# Patient Record
Sex: FEMALE | Race: OTHER
[De-identification: names, ages, dates, MRNs, and addresses within clinical notes are randomized per-mention and may not be internally consistent; named-entity substitution may affect disease eponyms.]

---

## 2017-07-16 ENCOUNTER — HOSPITAL ENCOUNTER (EMERGENCY)
Dept: HOSPITAL 74 - JERFT | Age: 32
Discharge: HOME | End: 2017-07-16
Payer: COMMERCIAL

## 2017-07-16 VITALS — SYSTOLIC BLOOD PRESSURE: 107 MMHG | HEART RATE: 74 BPM | TEMPERATURE: 98.2 F | DIASTOLIC BLOOD PRESSURE: 45 MMHG

## 2017-07-16 VITALS — BODY MASS INDEX: 35.6 KG/M2
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
## 2017-07-16 DIAGNOSIS — S71.152A: Primary | ICD-10-CM
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
## 2017-07-16 PROCEDURE — 3E0234Z INTRODUCTION OF SERUM, TOXOID AND VACCINE INTO MUSCLE, PERCUTANEOUS APPROACH: ICD-10-PCS
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
## 2019-02-19 ENCOUNTER — APPOINTMENT (OUTPATIENT)
Dept: SURGERY | Facility: CLINIC | Age: 34
End: 2019-02-19

## 2019-02-19 PROBLEM — Z00.00 ENCOUNTER FOR PREVENTIVE HEALTH EXAMINATION: Status: ACTIVE | Noted: 2019-02-19

## 2019-03-05 ENCOUNTER — APPOINTMENT (OUTPATIENT)
Dept: SURGERY | Facility: CLINIC | Age: 34
End: 2019-03-05
Payer: MEDICAID

## 2019-03-05 ENCOUNTER — LABORATORY RESULT (OUTPATIENT)
Age: 34
End: 2019-03-05

## 2019-03-05 VITALS — TEMPERATURE: 98 F | WEIGHT: 180 LBS

## 2019-03-05 VITALS
DIASTOLIC BLOOD PRESSURE: 78 MMHG | HEART RATE: 58 BPM | SYSTOLIC BLOOD PRESSURE: 112 MMHG | HEIGHT: 67 IN | BODY MASS INDEX: 28.19 KG/M2

## 2019-03-05 PROCEDURE — 99215 OFFICE O/P EST HI 40 MIN: CPT

## 2019-03-13 ENCOUNTER — OUTPATIENT (OUTPATIENT)
Dept: OUTPATIENT SERVICES | Facility: HOSPITAL | Age: 34
LOS: 1 days | End: 2019-03-13
Payer: MEDICAID

## 2019-03-13 VITALS
SYSTOLIC BLOOD PRESSURE: 128 MMHG | RESPIRATION RATE: 18 BRPM | HEART RATE: 60 BPM | WEIGHT: 190.04 LBS | HEIGHT: 67 IN | TEMPERATURE: 97 F | OXYGEN SATURATION: 99 % | DIASTOLIC BLOOD PRESSURE: 74 MMHG

## 2019-03-13 DIAGNOSIS — K43.2 INCISIONAL HERNIA WITHOUT OBSTRUCTION OR GANGRENE: ICD-10-CM

## 2019-03-13 DIAGNOSIS — Z90.49 ACQUIRED ABSENCE OF OTHER SPECIFIED PARTS OF DIGESTIVE TRACT: Chronic | ICD-10-CM

## 2019-03-13 DIAGNOSIS — Z01.818 ENCOUNTER FOR OTHER PREPROCEDURAL EXAMINATION: ICD-10-CM

## 2019-03-13 LAB — HCG SERPL-ACNC: <1 MIU/ML — SIGNIFICANT CHANGE UP

## 2019-03-13 PROCEDURE — 84702 CHORIONIC GONADOTROPIN TEST: CPT

## 2019-03-13 PROCEDURE — 36415 COLL VENOUS BLD VENIPUNCTURE: CPT

## 2019-03-13 PROCEDURE — 86850 RBC ANTIBODY SCREEN: CPT

## 2019-03-13 PROCEDURE — 86901 BLOOD TYPING SEROLOGIC RH(D): CPT

## 2019-03-13 PROCEDURE — G0463: CPT

## 2019-03-13 PROCEDURE — 86900 BLOOD TYPING SEROLOGIC ABO: CPT

## 2019-03-13 NOTE — H&P PST ADULT - NEGATIVE CARDIOVASCULAR SYMPTOMS
no chest pain/no palpitations/no paroxysmal nocturnal dyspnea/no peripheral edema/no orthopnea/no dyspnea on exertion

## 2019-03-13 NOTE — H&P PST ADULT - NEGATIVE GASTROINTESTINAL SYMPTOMS
no diarrhea/no flatulence/no vomiting/no constipation/no change in bowel habits/no abdominal pain/no nausea

## 2019-03-13 NOTE — H&P PST ADULT - NSANTHOSAYNRD_GEN_A_CORE
No. WILBER screening performed.  STOP BANG Legend: 0-2 = LOW Risk; 3-4 = INTERMEDIATE Risk; 5-8 = HIGH Risk

## 2019-03-13 NOTE — H&P PST ADULT - REASON FOR ADMISSION
"I have a hernia appeared four years ago that keeps on popping out already after two surgeries" "I have a hernia appeared four years ago that still keeps on popping out already after two surgeries" "I have a hernia which appeared four years ago that still keeps on popping out already after two surgeries"

## 2019-03-13 NOTE — H&P PST ADULT - NSICDXFAMILYHX_GEN_ALL_CORE_FT
FAMILY HISTORY:  Family history of hypertension in mother  Family history of throat cancer, father  FH: lung cancer, father

## 2019-03-13 NOTE — H&P PST ADULT - MUSCULOSKELETAL
detailed exam no joint warmth/normal strength/ROM intact/normal/no joint swelling/no joint erythema/no calf tenderness details…

## 2019-03-13 NOTE — H&P PST ADULT - ASSESSMENT
33 years old female with PMHx of asthma controlled, never been intubated and PSHx incisional hernia X 2, appendectomy, left foot bunion removal, right hand wrist artery repair presented for presurgical evaluation for repair of incisional hernia, removal of excess skin

## 2019-03-13 NOTE — H&P PST ADULT - NSICDXPROBLEM_GEN_ALL_CORE_FT
PROBLEM DIAGNOSES  Problem: Incisional hernia without obstruction or gangrene  Assessment and Plan: -patient is scheduled for repair of incisional hernia/ removal of excess skin on 3/15/2019

## 2019-03-13 NOTE — H&P PST ADULT - NEGATIVE GENERAL GENITOURINARY SYMPTOMS
no dysuria/no hematuria/no flank pain R/no flank pain L/no renal colic/no incontinence/normal urinary frequency/no urinary hesitancy

## 2019-03-13 NOTE — H&P PST ADULT - HISTORY OF PRESENT ILLNESS
33 years old female with PMHx of asthma controlled, never been intubated and PSHx incisional hernia X 2, left foot bunion removal, right hand wrist artery repair presented for presurgical evaluation for repair of incisional hernia, removal of excess skin 33 years old female with PMHx of asthma controlled, never been intubated and PSHx incisional hernia X 2, appendectomy, left foot bunion removal, right hand wrist artery repair presented for presurgical evaluation for repair of incisional hernia, removal of excess skin

## 2019-03-13 NOTE — H&P PST ADULT - NEGATIVE NEUROLOGICAL SYMPTOMS
no transient paralysis/no weakness/no generalized seizures/no paresthesias/no difficulty walking/no focal seizures

## 2019-03-13 NOTE — H&P PST ADULT - NEUROLOGICAL DETAILS
normal strength/responds to pain/alert and oriented x 3/responds to verbal commands/sensation intact/deep reflexes intact/cranial nerves intact

## 2019-03-14 ENCOUNTER — TRANSCRIPTION ENCOUNTER (OUTPATIENT)
Age: 34
End: 2019-03-14

## 2019-03-14 DIAGNOSIS — Z98.890 OTHER SPECIFIED POSTPROCEDURAL STATES: Chronic | ICD-10-CM

## 2019-03-14 RX ORDER — SODIUM CHLORIDE 9 MG/ML
3 INJECTION INTRAMUSCULAR; INTRAVENOUS; SUBCUTANEOUS EVERY 8 HOURS
Qty: 0 | Refills: 0 | Status: DISCONTINUED | OUTPATIENT
Start: 2019-03-15 | End: 2019-03-23

## 2019-03-15 ENCOUNTER — OUTPATIENT (OUTPATIENT)
Dept: OUTPATIENT SERVICES | Facility: HOSPITAL | Age: 34
LOS: 1 days | End: 2019-03-15
Payer: MEDICAID

## 2019-03-15 ENCOUNTER — RESULT REVIEW (OUTPATIENT)
Age: 34
End: 2019-03-15

## 2019-03-15 ENCOUNTER — APPOINTMENT (OUTPATIENT)
Dept: SURGERY | Facility: HOSPITAL | Age: 34
End: 2019-03-15

## 2019-03-15 VITALS
OXYGEN SATURATION: 100 % | DIASTOLIC BLOOD PRESSURE: 64 MMHG | RESPIRATION RATE: 16 BRPM | HEART RATE: 65 BPM | SYSTOLIC BLOOD PRESSURE: 117 MMHG | TEMPERATURE: 98 F

## 2019-03-15 VITALS
DIASTOLIC BLOOD PRESSURE: 64 MMHG | TEMPERATURE: 98 F | SYSTOLIC BLOOD PRESSURE: 130 MMHG | OXYGEN SATURATION: 99 % | WEIGHT: 199.96 LBS | HEIGHT: 67 IN | RESPIRATION RATE: 16 BRPM | HEART RATE: 67 BPM

## 2019-03-15 DIAGNOSIS — Z98.890 OTHER SPECIFIED POSTPROCEDURAL STATES: Chronic | ICD-10-CM

## 2019-03-15 DIAGNOSIS — Z90.49 ACQUIRED ABSENCE OF OTHER SPECIFIED PARTS OF DIGESTIVE TRACT: Chronic | ICD-10-CM

## 2019-03-15 DIAGNOSIS — Z01.818 ENCOUNTER FOR OTHER PREPROCEDURAL EXAMINATION: ICD-10-CM

## 2019-03-15 DIAGNOSIS — K43.2 INCISIONAL HERNIA WITHOUT OBSTRUCTION OR GANGRENE: ICD-10-CM

## 2019-03-15 PROCEDURE — 15847 EXC SKIN ABD ADD-ON: CPT

## 2019-03-15 PROCEDURE — 86850 RBC ANTIBODY SCREEN: CPT

## 2019-03-15 PROCEDURE — 86900 BLOOD TYPING SEROLOGIC ABO: CPT

## 2019-03-15 PROCEDURE — 15830 EXC EXCESSIVE SKIN ABDOMEN: CPT | Mod: XS

## 2019-03-15 PROCEDURE — 49560: CPT

## 2019-03-15 PROCEDURE — 86901 BLOOD TYPING SEROLOGIC RH(D): CPT

## 2019-03-15 PROCEDURE — 15830 EXC EXCESSIVE SKIN ABDOMEN: CPT | Mod: 59

## 2019-03-15 PROCEDURE — 36415 COLL VENOUS BLD VENIPUNCTURE: CPT

## 2019-03-15 RX ORDER — FENTANYL CITRATE 50 UG/ML
25 INJECTION INTRAVENOUS
Qty: 0 | Refills: 0 | Status: DISCONTINUED | OUTPATIENT
Start: 2019-03-15 | End: 2019-03-15

## 2019-03-15 RX ORDER — OXYCODONE AND ACETAMINOPHEN 5; 325 MG/1; MG/1
1 TABLET ORAL EVERY 4 HOURS
Qty: 0 | Refills: 0 | Status: DISCONTINUED | OUTPATIENT
Start: 2019-03-15 | End: 2019-03-15

## 2019-03-15 RX ADMIN — FENTANYL CITRATE 25 MICROGRAM(S): 50 INJECTION INTRAVENOUS at 15:20

## 2019-03-15 RX ADMIN — SODIUM CHLORIDE 3 MILLILITER(S): 9 INJECTION INTRAMUSCULAR; INTRAVENOUS; SUBCUTANEOUS at 09:56

## 2019-03-15 RX ADMIN — FENTANYL CITRATE 25 MICROGRAM(S): 50 INJECTION INTRAVENOUS at 14:38

## 2019-03-15 RX ADMIN — FENTANYL CITRATE 25 MICROGRAM(S): 50 INJECTION INTRAVENOUS at 14:48

## 2019-03-15 RX ADMIN — FENTANYL CITRATE 25 MICROGRAM(S): 50 INJECTION INTRAVENOUS at 14:23

## 2019-03-15 RX ADMIN — FENTANYL CITRATE 25 MICROGRAM(S): 50 INJECTION INTRAVENOUS at 15:10

## 2019-03-15 RX ADMIN — OXYCODONE AND ACETAMINOPHEN 1 TABLET(S): 5; 325 TABLET ORAL at 15:40

## 2019-03-15 NOTE — ASU DISCHARGE PLAN (ADULT/PEDIATRIC) - CARE PROVIDER_API CALL
Sam Rodriguez (MD)  Surgery  [address] [place] [address]  Phone: [phone]  Fax: [phone]  Follow Up Time:

## 2019-03-15 NOTE — ASU DISCHARGE PLAN (ADULT/PEDIATRIC) - CALL YOUR DOCTOR IF YOU HAVE ANY OF THE FOLLOWING:
Bleeding that does not stop/Swelling that gets worse/Fever greater than (need to indicate Fahrenheit or Celsius)/Wound/Surgical Site with redness, or foul smelling discharge or pus Swelling that gets worse/Pain not relieved by Medications/Unable to urinate/Nausea and vomiting that does not stop/Inability to tolerate liquids or foods/Bleeding that does not stop/Fever greater than (need to indicate Fahrenheit or Celsius)/Wound/Surgical Site with redness, or foul smelling discharge or pus

## 2019-03-15 NOTE — ASU DISCHARGE PLAN (ADULT/PEDIATRIC) - NURSING INSTRUCTIONS
keep dressing dry, clean, intact, for 2 days. After 2 days, remove dressing and leave steri strips on. You can shower with steri strips on. If it fall off after shower is OK, if not you leave it there, it will fall off by itself in 2-3 days. No heavy lifting or strenuous activity. Nothing heavier than 10- 15 lbs for 6 -8 weeks No heavy lifting or strenuous activity. Nothing heavier than 10- 15 lbs fo two weeks

## 2019-03-17 ENCOUNTER — EMERGENCY (EMERGENCY)
Facility: HOSPITAL | Age: 34
LOS: 1 days | Discharge: HOME | End: 2019-03-17
Attending: EMERGENCY MEDICINE
Payer: MEDICAID

## 2019-03-17 VITALS
HEART RATE: 64 BPM | TEMPERATURE: 98 F | RESPIRATION RATE: 17 BRPM | DIASTOLIC BLOOD PRESSURE: 55 MMHG | OXYGEN SATURATION: 99 % | SYSTOLIC BLOOD PRESSURE: 115 MMHG

## 2019-03-17 VITALS — WEIGHT: 220.02 LBS | HEIGHT: 67 IN

## 2019-03-17 DIAGNOSIS — Z98.890 OTHER SPECIFIED POSTPROCEDURAL STATES: Chronic | ICD-10-CM

## 2019-03-17 DIAGNOSIS — Z90.49 ACQUIRED ABSENCE OF OTHER SPECIFIED PARTS OF DIGESTIVE TRACT: Chronic | ICD-10-CM

## 2019-03-17 PROBLEM — J45.909 UNSPECIFIED ASTHMA, UNCOMPLICATED: Chronic | Status: ACTIVE | Noted: 2019-03-14

## 2019-03-17 LAB
ALBUMIN SERPL ELPH-MCNC: 3.6 G/DL — SIGNIFICANT CHANGE UP (ref 3.5–5)
ALP SERPL-CCNC: 39 U/L — LOW (ref 40–120)
ALT FLD-CCNC: 13 U/L DA — SIGNIFICANT CHANGE UP (ref 10–60)
ANION GAP SERPL CALC-SCNC: 4 MMOL/L — LOW (ref 5–17)
APPEARANCE UR: CLEAR — SIGNIFICANT CHANGE UP
AST SERPL-CCNC: 10 U/L — SIGNIFICANT CHANGE UP (ref 10–40)
BASOPHILS # BLD AUTO: 0.05 K/UL — SIGNIFICANT CHANGE UP (ref 0–0.2)
BASOPHILS NFR BLD AUTO: 0.6 % — SIGNIFICANT CHANGE UP (ref 0–2)
BILIRUB SERPL-MCNC: 0.4 MG/DL — SIGNIFICANT CHANGE UP (ref 0.2–1.2)
BILIRUB UR-MCNC: NEGATIVE — SIGNIFICANT CHANGE UP
BUN SERPL-MCNC: 8 MG/DL — SIGNIFICANT CHANGE UP (ref 7–18)
CALCIUM SERPL-MCNC: 8.5 MG/DL — SIGNIFICANT CHANGE UP (ref 8.4–10.5)
CHLORIDE SERPL-SCNC: 108 MMOL/L — SIGNIFICANT CHANGE UP (ref 96–108)
CO2 SERPL-SCNC: 28 MMOL/L — SIGNIFICANT CHANGE UP (ref 22–31)
COLOR SPEC: YELLOW — SIGNIFICANT CHANGE UP
CREAT SERPL-MCNC: 0.76 MG/DL — SIGNIFICANT CHANGE UP (ref 0.5–1.3)
DIFF PNL FLD: ABNORMAL
EOSINOPHIL # BLD AUTO: 0.04 K/UL — SIGNIFICANT CHANGE UP (ref 0–0.5)
EOSINOPHIL NFR BLD AUTO: 0.5 % — SIGNIFICANT CHANGE UP (ref 0–6)
GLUCOSE SERPL-MCNC: 89 MG/DL — SIGNIFICANT CHANGE UP (ref 70–99)
GLUCOSE UR QL: NEGATIVE — SIGNIFICANT CHANGE UP
HCG SERPL-ACNC: <1 MIU/ML — SIGNIFICANT CHANGE UP
HCT VFR BLD CALC: 35.9 % — SIGNIFICANT CHANGE UP (ref 34.5–45)
HGB BLD-MCNC: 11.5 G/DL — SIGNIFICANT CHANGE UP (ref 11.5–15.5)
IMM GRANULOCYTES NFR BLD AUTO: 0.4 % — SIGNIFICANT CHANGE UP (ref 0–1.5)
KETONES UR-MCNC: NEGATIVE — SIGNIFICANT CHANGE UP
LEUKOCYTE ESTERASE UR-ACNC: ABNORMAL
LIDOCAIN IGE QN: 62 U/L — LOW (ref 73–393)
LYMPHOCYTES # BLD AUTO: 1.94 K/UL — SIGNIFICANT CHANGE UP (ref 1–3.3)
LYMPHOCYTES # BLD AUTO: 23 % — SIGNIFICANT CHANGE UP (ref 13–44)
MCHC RBC-ENTMCNC: 30.7 PG — SIGNIFICANT CHANGE UP (ref 27–34)
MCHC RBC-ENTMCNC: 32 GM/DL — SIGNIFICANT CHANGE UP (ref 32–36)
MCV RBC AUTO: 96 FL — SIGNIFICANT CHANGE UP (ref 80–100)
MONOCYTES # BLD AUTO: 0.57 K/UL — SIGNIFICANT CHANGE UP (ref 0–0.9)
MONOCYTES NFR BLD AUTO: 6.8 % — SIGNIFICANT CHANGE UP (ref 2–14)
NEUTROPHILS # BLD AUTO: 5.79 K/UL — SIGNIFICANT CHANGE UP (ref 1.8–7.4)
NEUTROPHILS NFR BLD AUTO: 68.7 % — SIGNIFICANT CHANGE UP (ref 43–77)
NITRITE UR-MCNC: NEGATIVE — SIGNIFICANT CHANGE UP
NRBC # BLD: 0 /100 WBCS — SIGNIFICANT CHANGE UP (ref 0–0)
PH UR: 8 — SIGNIFICANT CHANGE UP (ref 5–8)
PLATELET # BLD AUTO: 175 K/UL — SIGNIFICANT CHANGE UP (ref 150–400)
POTASSIUM SERPL-MCNC: 3.6 MMOL/L — SIGNIFICANT CHANGE UP (ref 3.5–5.3)
POTASSIUM SERPL-SCNC: 3.6 MMOL/L — SIGNIFICANT CHANGE UP (ref 3.5–5.3)
PROT SERPL-MCNC: 6.6 G/DL — SIGNIFICANT CHANGE UP (ref 6–8.3)
PROT UR-MCNC: 15
RBC # BLD: 3.74 M/UL — LOW (ref 3.8–5.2)
RBC # FLD: 12.9 % — SIGNIFICANT CHANGE UP (ref 10.3–14.5)
SODIUM SERPL-SCNC: 140 MMOL/L — SIGNIFICANT CHANGE UP (ref 135–145)
SP GR SPEC: 1.01 — SIGNIFICANT CHANGE UP (ref 1.01–1.02)
UROBILINOGEN FLD QL: NEGATIVE — SIGNIFICANT CHANGE UP
WBC # BLD: 8.42 K/UL — SIGNIFICANT CHANGE UP (ref 3.8–10.5)
WBC # FLD AUTO: 8.42 K/UL — SIGNIFICANT CHANGE UP (ref 3.8–10.5)

## 2019-03-17 PROCEDURE — 74177 CT ABD & PELVIS W/CONTRAST: CPT

## 2019-03-17 PROCEDURE — 71275 CT ANGIOGRAPHY CHEST: CPT

## 2019-03-17 PROCEDURE — 96375 TX/PRO/DX INJ NEW DRUG ADDON: CPT

## 2019-03-17 PROCEDURE — 85027 COMPLETE CBC AUTOMATED: CPT

## 2019-03-17 PROCEDURE — 74177 CT ABD & PELVIS W/CONTRAST: CPT | Mod: 26

## 2019-03-17 PROCEDURE — 83690 ASSAY OF LIPASE: CPT

## 2019-03-17 PROCEDURE — 81001 URINALYSIS AUTO W/SCOPE: CPT

## 2019-03-17 PROCEDURE — 71275 CT ANGIOGRAPHY CHEST: CPT | Mod: 26

## 2019-03-17 PROCEDURE — 99284 EMERGENCY DEPT VISIT MOD MDM: CPT

## 2019-03-17 PROCEDURE — 36415 COLL VENOUS BLD VENIPUNCTURE: CPT

## 2019-03-17 PROCEDURE — 96374 THER/PROPH/DIAG INJ IV PUSH: CPT | Mod: XU

## 2019-03-17 PROCEDURE — 84702 CHORIONIC GONADOTROPIN TEST: CPT

## 2019-03-17 PROCEDURE — 99284 EMERGENCY DEPT VISIT MOD MDM: CPT | Mod: 25

## 2019-03-17 PROCEDURE — 80053 COMPREHEN METABOLIC PANEL: CPT

## 2019-03-17 RX ORDER — SODIUM CHLORIDE 9 MG/ML
1000 INJECTION INTRAMUSCULAR; INTRAVENOUS; SUBCUTANEOUS ONCE
Qty: 0 | Refills: 0 | Status: COMPLETED | OUTPATIENT
Start: 2019-03-17 | End: 2019-03-17

## 2019-03-17 RX ORDER — MORPHINE SULFATE 50 MG/1
4 CAPSULE, EXTENDED RELEASE ORAL ONCE
Qty: 0 | Refills: 0 | Status: DISCONTINUED | OUTPATIENT
Start: 2019-03-17 | End: 2019-03-17

## 2019-03-17 RX ORDER — ACETAMINOPHEN 500 MG
1000 TABLET ORAL ONCE
Qty: 0 | Refills: 0 | Status: COMPLETED | OUTPATIENT
Start: 2019-03-17 | End: 2019-03-17

## 2019-03-17 RX ADMIN — SODIUM CHLORIDE 1000 MILLILITER(S): 9 INJECTION INTRAMUSCULAR; INTRAVENOUS; SUBCUTANEOUS at 13:01

## 2019-03-17 RX ADMIN — Medication 400 MILLIGRAM(S): at 13:01

## 2019-03-17 RX ADMIN — Medication 1000 MILLIGRAM(S): at 13:56

## 2019-03-17 RX ADMIN — MORPHINE SULFATE 4 MILLIGRAM(S): 50 CAPSULE, EXTENDED RELEASE ORAL at 13:56

## 2019-03-17 NOTE — ED PROVIDER NOTE - PROGRESS NOTE DETAILS
Discussed care with surgical house officer who stated pt is okay to discharge if work up is negative. Pain most likely post operative pain. Reassessed pt following administration of analgesics. Pt's pain improved significantly. Pt is requesting food. Awaiting CT results. Imaging shows no acute process. Patient's pain improved. Will DC home.

## 2019-03-17 NOTE — ED PROVIDER NOTE - HISTORY ATTESTATION, MLM
Patient Instructions by LESLY Jacobson at 01/11/17 10:04 AM     Author:  LESLY Jacobson Service:  (none) Author Type:  Psychologist     Filed:  01/11/17 10:04 AM Encounter Date:  1/11/2017 Status:  Signed     :  LESLY Jacobson (Psychologist)            PATIENT INFORMATION     Follow-Up  -- Make an appointment with LESLY Naranjo in 2 weeks    Additional Educational Resources: For additional resources regarding your symptoms, diagnosis, or further health information, please visit the Health Resources section on [email or web address] or the Online Health Resources section in [employer]. Revision History        User Key Date/Time User Provider Type Action    > [N/A] 01/11/17 10:04 AM Nicolas Shea PSY. D Psychologist Sign I have reviewed and confirmed nurses' notes...

## 2019-03-17 NOTE — ED ADULT NURSE NOTE - NSIMPLEMENTINTERV_GEN_ALL_ED
Implemented All Universal Safety Interventions:  [place] to call system. Call bell, personal items and telephone within reach. Instruct patient to call for assistance. Room bathroom lighting operational. Non-slip footwear when patient is off stretcher. Physically safe environment: no spills, clutter or unnecessary equipment. Stretcher in lowest position, wheels locked, appropriate side rails in place.

## 2019-03-17 NOTE — ED ADULT NURSE NOTE - OBJECTIVE STATEMENT
pt is a 32 y/o female with c/o abdominal pain  s/p hernia surgery 2 days ago  .  pt still with  SUNITA  on her abdomen, no active bleeding  noted , pt wound dressing is intact. abdomen soft non distended + BS noted.

## 2019-03-17 NOTE — ED PROVIDER NOTE - ABDOMINAL EXAM
tender.../3 laparoscopic incisions C/D/I along the inferior abd, 2 incisions with SUNITA drain suctioning small amount of serosanguinous/soft

## 2019-03-17 NOTE — ED PROVIDER NOTE - OBJECTIVE STATEMENT
34 y/o F pt with PMHx of asthma and s/p incisional hernia repair done 3 days ago by Dr. Rodriguez presents to ED c/o generalized abd pain x3 days. Pt reports that pain has been present since the procedure, and relays having an episode of shortness of breath and generalized weakness yesterday and today. Pain is localized throughout abd and R lower rib. Pt was previously evaluated at [facility] and was discharge to home after negative work up. Patient denies fever, chills, syncope, nausea, vomiting, BM issues, urinary symptoms, or any other complaints.

## 2019-03-17 NOTE — ED PROVIDER NOTE - CLINICAL SUMMARY MEDICAL DECISION MAKING FREE TEXT BOX
32 y/o F pt s/p incisional hernia repair, post op 3 days, presents with abd pain for 3 days. Will evaluate for intraabdominal complications versus PE. Will provide analgesics and reassess.

## 2019-03-18 NOTE — BRIEF OPERATIVE NOTE - NSICDXBRIEFPROCEDURE_GEN_ALL_CORE_FT
PROCEDURES:  Surgical removal of excess skin of abdomen 18-Mar-2019 09:22:00  Cayetano Reyes  Open repair of incisional hernia in adult 18-Mar-2019 09:21:50  Cayetano Reyes

## 2019-03-18 NOTE — CHART NOTE - NSCHARTNOTEFT_GEN_A_CORE
Pt returned to ED c/o post-op pain day 2  ED was to do CT r/o PE as pt was having some pleuritic/rib pain  had neg w/u a day earlier at [facility]    Vital Signs Last 24 Hrs  T(C): 36.6 (17 Mar 2019 15:56), Max: 37.2 (17 Mar 2019 12:05)  T(F): 97.9 (17 Mar 2019 15:56), Max: 99 (17 Mar 2019 12:05)  HR: 64 (17 Mar 2019 15:56) (64 - 77)  BP: 115/55 (17 Mar 2019 15:56) (115/55 - 122/79)  BP(mean): --  RR: 17 (17 Mar 2019 15:56) (17 - 18)  SpO2: 99% (17 Mar 2019 15:56) (99% - 100%)                          11.5   8.42  )-----------( 175      ( 17 Mar 2019 13:03 )             35.9  CT results with no acute surgical path  Impression    Normal pulmonary vasculature with no evidence of PE.    Moderate bibasilar platelike atelectasis/possible infiltrate.    No acute intra-abdominal or intrapelvic findings.    Extensive soft tissue emphysema/infiltration in the lower abdominal and   pelvic wall anteriorly with two subcutaneous drains in place, no discrete   collection. Findings possibly postprocedural.      ED had discharged pt home after CT findings as pt's pain improved and was requesting food  d/w Dr Rey

## 2019-03-19 ENCOUNTER — APPOINTMENT (OUTPATIENT)
Dept: SURGERY | Facility: CLINIC | Age: 34
End: 2019-03-19
Payer: MEDICAID

## 2019-03-19 DIAGNOSIS — Z56.0 UNEMPLOYMENT, UNSPECIFIED: ICD-10-CM

## 2019-03-19 DIAGNOSIS — Z87.09 PERSONAL HISTORY OF OTHER DISEASES OF THE RESPIRATORY SYSTEM: ICD-10-CM

## 2019-03-19 DIAGNOSIS — Z78.9 OTHER SPECIFIED HEALTH STATUS: ICD-10-CM

## 2019-03-19 DIAGNOSIS — Z80.1 FAMILY HISTORY OF MALIGNANT NEOPLASM OF TRACHEA, BRONCHUS AND LUNG: ICD-10-CM

## 2019-03-19 DIAGNOSIS — K43.2 INCISIONAL HERNIA W/OUT OBSTRUCTION OR GANGRENE: ICD-10-CM

## 2019-03-19 LAB — SURGICAL PATHOLOGY STUDY: SIGNIFICANT CHANGE UP

## 2019-03-19 PROCEDURE — 99024 POSTOP FOLLOW-UP VISIT: CPT

## 2019-03-19 SDOH — ECONOMIC STABILITY - INCOME SECURITY: UNEMPLOYMENT, UNSPECIFIED: Z56.0

## 2019-03-19 NOTE — PHYSICAL EXAM
[Calm] : calm [de-identified] : The patient is alert, well-groomed, and cheerful. [de-identified] : Surgical wounds are  healing well. No signs of inflammation, infection or exudate. no recurrence. SUNITA drains x2 in suprapubic are with minimal serous output.

## 2019-03-19 NOTE — PLAN
[FreeTextEntry1] : no heavy lifting  4-6 weeks. \par patient will follow up in one week or if needed. Warning signs, follow up, and restrictions were discussed with the patient.

## 2019-03-19 NOTE — ASSESSMENT
[FreeTextEntry1] : Patient is s/p  Open repair of incisional hernia and surgical removal of excess skin of abdomen on  18-Mar-2019.  patient offers no complaints. patient reports no fever, chills,  or  pain.  Surgical wounds are  healing well. No signs of inflammation, infection or exudate. no recurrence. Patient reports good bowel movements and appetite.  \par \par  SUNITA drains x2 removed. no bleeding

## 2019-03-19 NOTE — HISTORY OF PRESENT ILLNESS
[de-identified] : Patient is s/p  Open repair of incisional hernia and surgical removal of excess skin of abdomen on  18-Mar-2019.  patient offers no complaints. patient reports no fever, chills,  or  pain.  Surgical wounds are  healing well. No signs of inflammation, infection or exudate. no recurrence. SUNITA drains x2 in suprapubic are with minimal serous output. Patient reports good bowel movements and appetite.  \par

## 2019-04-09 ENCOUNTER — APPOINTMENT (OUTPATIENT)
Dept: SURGERY | Facility: CLINIC | Age: 34
End: 2019-04-09
Payer: MEDICAID

## 2019-04-09 VITALS
SYSTOLIC BLOOD PRESSURE: 129 MMHG | BODY MASS INDEX: 28.72 KG/M2 | HEART RATE: 72 BPM | DIASTOLIC BLOOD PRESSURE: 69 MMHG | HEIGHT: 67 IN | WEIGHT: 183 LBS | TEMPERATURE: 97.4 F

## 2019-04-09 PROCEDURE — 99024 POSTOP FOLLOW-UP VISIT: CPT

## 2019-04-09 NOTE — PHYSICAL EXAM
[Calm] : calm [de-identified] : The patient is alert, well-groomed, and cheerful. [de-identified] : Surgical wounds are  healing well. No signs of inflammation, infection or exudate. no recurrence.

## 2019-04-09 NOTE — ASSESSMENT
[FreeTextEntry1] : Patient is s/p  Open repair of incisional hernia and surgical removal of excess skin of abdomen on  18-Mar-2019.  patient offers no complaints. patient reports no fever, chills,  or  pain. patient reports minor drainage from the umbilicus. on examination her surgical wounds are  healing well. No signs of inflammation, infection or exudate. no recurrence. Patient reports good bowel movements and appetite.  \par wound care discussed

## 2019-04-09 NOTE — HISTORY OF PRESENT ILLNESS
[de-identified] : Patient is s/p  Open repair of incisional hernia and surgical removal of excess skin of abdomen on  18-Mar-2019.  patient offers no complaints. patient reports no fever, chills,  or  pain. patient reports minor drainage from the umbilicus. on examination her surgical wounds are  healing well. No signs of inflammation, infection or exudate. no recurrence. Patient reports good bowel movements and appetite.  \par

## 2019-04-16 ENCOUNTER — APPOINTMENT (OUTPATIENT)
Dept: SURGERY | Facility: CLINIC | Age: 34
End: 2019-04-16
Payer: MEDICAID

## 2019-04-16 PROCEDURE — 99024 POSTOP FOLLOW-UP VISIT: CPT

## 2019-04-16 NOTE — PLAN
[FreeTextEntry1] :  patient will follow up in one month or if needed. Warning signs, follow up, and restrictions were discussed with the patient.

## 2019-04-16 NOTE — HISTORY OF PRESENT ILLNESS
[de-identified] : Patient is s/p  Open repair of incisional hernia and surgical removal of excess skin of abdomen on  18-Mar-2019.  patient offers no complaints. patient reports no fever, chills,  or  pain. patient reports minor drainage from the umbilicus. on examination her surgical wounds are  healing well. No signs of inflammation, infection or exudate. no recurrence. Patient reports good bowel movements and appetite.  \par

## 2019-04-16 NOTE — ASSESSMENT
[FreeTextEntry1] : Patient is doing well, with excellent post-operative recovery. All surgical incisions are healing well and as expected. There is no evidence of infection or complication, and patient is progressing as expected. Post-operative wound care, activity, restrictions and precautions reinforced. Patient instructed to refrain from any heavy lifting greater than 10-15 pounds for at least 4-6 weeks post-operatively. Patient's questions and concerns addressed to patient's satisfaction.\par

## 2019-04-16 NOTE — PHYSICAL EXAM
[Calm] : calm [de-identified] : Surgical wounds are  healing well. No signs of inflammation, infection or exudate. no recurrence.  [de-identified] : The patient is alert, well-groomed, and cheerful.

## 2019-04-23 ENCOUNTER — APPOINTMENT (OUTPATIENT)
Dept: SURGERY | Facility: CLINIC | Age: 34
End: 2019-04-23

## 2019-05-14 ENCOUNTER — APPOINTMENT (OUTPATIENT)
Dept: SURGERY | Facility: CLINIC | Age: 34
End: 2019-05-14
Payer: MEDICAID

## 2019-05-14 DIAGNOSIS — G89.18 OTHER ACUTE POSTPROCEDURAL PAIN: ICD-10-CM

## 2019-05-14 PROCEDURE — 99024 POSTOP FOLLOW-UP VISIT: CPT

## 2019-05-14 RX ORDER — ACETAMINOPHEN AND CODEINE 300; 30 MG/1; MG/1
300-30 TABLET ORAL 3 TIMES DAILY
Qty: 15 | Refills: 0 | Status: ACTIVE | COMMUNITY
Start: 2019-05-14 | End: 1900-01-01

## 2019-05-14 NOTE — ASSESSMENT
[FreeTextEntry1] : Patient is doing well, with excellent post-operative recovery. All surgical incisions are healing well and as expected. There is no evidence of infection or complication, and patient is progressing as expected. Post-operative wound care, activity, restrictions and precautions reinforced. Patient instructed to refrain from any heavy lifting greater than 10-15 pounds for at least 4-6 weeks post-operatively. Patient's questions and concerns addressed to patient's satisfaction. patient advised to take medication for pain PRN\par

## 2019-05-14 NOTE — PHYSICAL EXAM
[Calm] : calm [de-identified] : The patient is alert, well-groomed, and cheerful. [de-identified] : Surgical wounds are  healing well. No signs of inflammation, infection or exudate. no recurrence.

## 2019-05-14 NOTE — HISTORY OF PRESENT ILLNESS
[de-identified] : Patient is s/p  Open repair of incisional hernia and surgical removal of excess skin of abdomen on  18-Mar-2019.  Today patient offers no complaints. patient reports no fever or chills. patient has pain around the umbilicus. On examination her surgical wounds are  healing well. No signs of inflammation, infection or exudate. Patient reports good bowel movements and appetite.

## 2019-06-04 ENCOUNTER — APPOINTMENT (OUTPATIENT)
Dept: SURGERY | Facility: CLINIC | Age: 34
End: 2019-06-04
Payer: MEDICAID

## 2019-06-04 DIAGNOSIS — K43.2 INCISIONAL HERNIA W/OUT OBSTRUCTION OR GANGRENE: ICD-10-CM

## 2019-06-04 PROCEDURE — 99024 POSTOP FOLLOW-UP VISIT: CPT

## 2019-06-04 NOTE — ASSESSMENT
[FreeTextEntry1] : Patient is doing well, with excellent post-operative recovery. All surgical incisions are healing well and as expected. There is no evidence of infection or complication, and patient is progressing as expected. Post-operative wound care, activity, restrictions and precautions reinforced. Patient instructed to refrain from any heavy lifting greater than 10-15 pounds for at least 4-6 weeks post-operatively. Patient's questions and concerns addressed to patient's satisfaction.

## 2019-06-04 NOTE — HISTORY OF PRESENT ILLNESS
[de-identified] : Patient is s/p  Open repair of incisional hernia and surgical removal of excess skin of abdomen on  18-Mar-2019.    Today patient offers no complaints. patient reports no fever, chills,  or  pain.  Surgical wound is healing well. No signs of inflammation, infection or exudate. no recurrence. Patient reports good bowel movements and appetite. \par

## 2019-06-04 NOTE — PHYSICAL EXAM
[Calm] : calm [de-identified] : The patient is alert, well-groomed, and cheerful. [de-identified] : Surgical wounds are  healing well. No signs of inflammation, infection or exudate. no recurrence.

## 2019-08-15 NOTE — H&P PST ADULT - ADMIT DATE
13-Mar-2019 Tissue Cultured Epidermal Autograft Text: The defect edges were debeveled with a #15 scalpel blade.  Given the location of the defect, shape of the defect and the proximity to free margins a tissue cultured epidermal autograft was deemed most appropriate.  The graft was then trimmed to fit the size of the defect.  The graft was then placed in the primary defect and oriented appropriately.

## 2024-08-07 ENCOUNTER — HOSPITAL ENCOUNTER (EMERGENCY)
Dept: HOSPITAL 74 - JER | Age: 39
Discharge: HOME | End: 2024-08-07
Payer: COMMERCIAL

## 2024-08-07 VITALS
TEMPERATURE: 98.3 F | SYSTOLIC BLOOD PRESSURE: 114 MMHG | RESPIRATION RATE: 18 BRPM | HEART RATE: 68 BPM | DIASTOLIC BLOOD PRESSURE: 57 MMHG

## 2024-08-07 VITALS — BODY MASS INDEX: 25.8 KG/M2

## 2024-08-07 DIAGNOSIS — N61.1: Primary | ICD-10-CM

## 2024-08-07 LAB
ALBUMIN SERPL-MCNC: 4.1 G/DL (ref 3.4–5)
ALP SERPL-CCNC: 49 U/L (ref 45–117)
ALT SERPL-CCNC: 20 U/L (ref 13–61)
ANION GAP SERPL CALC-SCNC: 6 MMOL/L (ref 4–13)
APTT BLD: 31.6 SECONDS (ref 25.2–36.5)
AST SERPL-CCNC: 11 U/L (ref 15–37)
BASOPHILS # BLD: 1 % (ref 0–2)
BILIRUB SERPL-MCNC: 0.6 MG/DL (ref 0.2–1)
BUN SERPL-MCNC: 10.7 MG/DL (ref 7–18)
CALCIUM SERPL-MCNC: 9 MG/DL (ref 8.5–10.1)
CHLORIDE SERPL-SCNC: 108 MMOL/L (ref 98–107)
CO2 SERPL-SCNC: 25 MMOL/L (ref 21–32)
CREAT SERPL-MCNC: 0.8 MG/DL (ref 0.55–1.3)
DEPRECATED RDW RBC AUTO: 13.1 % (ref 11.6–15.6)
EOSINOPHIL # BLD: 2 % (ref 0–4.5)
ERYTHROCYTE [SEDIMENTATION RATE] IN BLOOD BY WESTERGREN METHOD: 2 MM/HR (ref 0–20)
GLUCOSE SERPL-MCNC: 78 MG/DL (ref 74–106)
HCT VFR BLD CALC: 35.4 % (ref 32.4–45.2)
HGB BLD-MCNC: 12.2 GM/DL (ref 10.7–15.3)
INR BLD: 1.09 (ref 0.83–1.09)
LYMPHOCYTES # BLD: 32.7 % (ref 8–40)
MCH RBC QN AUTO: 31.4 PG (ref 25.7–33.7)
MCHC RBC AUTO-ENTMCNC: 34.3 G/DL (ref 32–36)
MCV RBC: 91.3 FL (ref 80–96)
MONOCYTES # BLD AUTO: 6 % (ref 3.8–10.2)
NEUTROPHILS # BLD: 58.3 % (ref 42.8–82.8)
PLATELET # BLD AUTO: 202 10^3/UL (ref 134–434)
PMV BLD: 8.9 FL (ref 7.5–11.1)
POTASSIUM SERPLBLD-SCNC: 3.8 MMOL/L (ref 3.5–5.1)
PROT SERPL-MCNC: 6.9 G/DL (ref 6.4–8.2)
PT PNL PPP: 12.5 SEC (ref 9.7–13)
RBC # BLD AUTO: 3.88 M/MM3 (ref 3.6–5.2)
SODIUM SERPL-SCNC: 139 MMOL/L (ref 136–145)
WBC # BLD AUTO: 7.1 K/MM3 (ref 4–10)

## 2024-08-07 PROCEDURE — 3E0333Z INTRODUCTION OF ANTI-INFLAMMATORY INTO PERIPHERAL VEIN, PERCUTANEOUS APPROACH: ICD-10-PCS

## 2024-08-07 PROCEDURE — 3E033NZ INTRODUCTION OF ANALGESICS, HYPNOTICS, SEDATIVES INTO PERIPHERAL VEIN, PERCUTANEOUS APPROACH: ICD-10-PCS

## 2024-08-07 RX ADMIN — ACETAMINOPHEN ONE MG: 10 INJECTION, SOLUTION INTRAVENOUS at 19:24

## 2024-08-07 RX ADMIN — KETOROLAC TROMETHAMINE ONE MG: 15 INJECTION, SOLUTION INTRAMUSCULAR; INTRAVENOUS at 20:44

## 2024-08-07 RX ADMIN — CLINDAMYCIN HYDROCHLORIDE ONE MG: 150 CAPSULE ORAL at 22:20
